# Patient Record
Sex: MALE | Race: WHITE | NOT HISPANIC OR LATINO | Employment: FULL TIME | URBAN - METROPOLITAN AREA
[De-identification: names, ages, dates, MRNs, and addresses within clinical notes are randomized per-mention and may not be internally consistent; named-entity substitution may affect disease eponyms.]

---

## 2023-03-21 ENCOUNTER — OFFICE VISIT (OUTPATIENT)
Dept: PRIMARY CARE | Facility: CLINIC | Age: 28
End: 2023-03-21
Payer: COMMERCIAL

## 2023-03-21 VITALS
TEMPERATURE: 98.1 F | HEART RATE: 75 BPM | BODY MASS INDEX: 32.73 KG/M2 | RESPIRATION RATE: 18 BRPM | SYSTOLIC BLOOD PRESSURE: 100 MMHG | HEIGHT: 69 IN | WEIGHT: 221 LBS | OXYGEN SATURATION: 97 % | DIASTOLIC BLOOD PRESSURE: 66 MMHG

## 2023-03-21 DIAGNOSIS — R06.83 SNORING: ICD-10-CM

## 2023-03-21 DIAGNOSIS — E78.5 HYPERLIPIDEMIA, UNSPECIFIED HYPERLIPIDEMIA TYPE: ICD-10-CM

## 2023-03-21 DIAGNOSIS — R53.83 FATIGUE, UNSPECIFIED TYPE: ICD-10-CM

## 2023-03-21 DIAGNOSIS — Z13.220 SCREENING, LIPID: ICD-10-CM

## 2023-03-21 DIAGNOSIS — J98.01 BRONCHOSPASM, ACUTE: ICD-10-CM

## 2023-03-21 DIAGNOSIS — Z91.018 NUT ALLERGY: ICD-10-CM

## 2023-03-21 DIAGNOSIS — Z00.00 HEALTHCARE MAINTENANCE: Primary | ICD-10-CM

## 2023-03-21 PROCEDURE — 99385 PREV VISIT NEW AGE 18-39: CPT | Performed by: FAMILY MEDICINE

## 2023-03-21 PROCEDURE — 1036F TOBACCO NON-USER: CPT | Performed by: FAMILY MEDICINE

## 2023-03-21 RX ORDER — EPINEPHRINE 0.3 MG/.3ML
1 INJECTION SUBCUTANEOUS ONCE
COMMUNITY

## 2023-03-21 RX ORDER — ALBUTEROL SULFATE 90 UG/1
2 AEROSOL, METERED RESPIRATORY (INHALATION) EVERY 6 HOURS PRN
Qty: 6.7 G | Refills: 2 | Status: SHIPPED | OUTPATIENT
Start: 2023-03-21 | End: 2024-05-13 | Stop reason: SDUPTHER

## 2023-03-21 ASSESSMENT — PATIENT HEALTH QUESTIONNAIRE - PHQ9
1. LITTLE INTEREST OR PLEASURE IN DOING THINGS: NOT AT ALL
SUM OF ALL RESPONSES TO PHQ9 QUESTIONS 1 AND 2: 0
2. FEELING DOWN, DEPRESSED OR HOPELESS: NOT AT ALL

## 2023-03-21 ASSESSMENT — ENCOUNTER SYMPTOMS
DEPRESSION: 0
OCCASIONAL FEELINGS OF UNSTEADINESS: 0
LOSS OF SENSATION IN FEET: 0

## 2023-03-21 NOTE — PATIENT INSTRUCTIONS
Obtain RAST testing for further evaluation.  Utilize albuterol inhaler. 2 puffs every 6 hours, as needed.  Please undergo home sleep study. The instructions and equipment will be sent to your home.   I have instructed the patient to follow-up with me in 6 weeks or sooner if needed. Please obtain fasting labs prior to visit. You may drink water or black coffee prior.

## 2023-03-21 NOTE — PROGRESS NOTES
Subjective   Patient ID: Thong Desai is a 28 y.o. male who presents for New Patient Visit.    Patient last seen a provider in over 6 yrs ago.  He has no medical complaints today.     He is requesting a referral to a allergist for an allergen test due to recent reaction to possible Cashews/Tree Nuts/Pine Nuts/Peanuts. He notes allergy to peanuts, resolved with Benadryl. ED on 01/18/23 due to Tree nut reaction.     Patient asks to obtain an inhaler due to frequent wheezing with exertion. He states he has not been diagnosed with asthma. Patient's mother and brother have diagnosed asthma. He notes impacted breathing with dust and pet dandruff as well.    Patient reports his father has sleep apnea, and inquires about obtaining a sleep study or waiting for apnea to possibly develop. He wakes approximately 3-5 times to urinate. Patient's wife states he has recently began snoring. He is open to doing a home sleep study.    He has fatigue, but notes he has contracted COVID-19 three times.    Review of Systems  Except positives as noted in the CC & HPI   Constitutional: Denies fevers, chills, night sweats, fatigue, weight changes, change in appetite   Eyes: Denies blurry vision, double vision   ENT: Denies otalgia, trouble hearing, tinnitus, vertigo, nasal congestion, rhinorrhea, sore throat   Neck: Denies swelling, masses   Cardiovascular: Denies chest pain, palpitations, edema, orthopnea, syncope   Respiratory: Denies dyspnea, cough, wheezing, postural nocturnal dyspnea   Gastrointestinal: Denies abdominal pain, nausea, vomiting, diarrhea, constipation, melena, hematochezia   Genitourinary: Positive frequency, positive nocturia. Denies dysuria, hematuria, urgency   Musculoskeletal: Denies back pain, neck pain, arthralgias, myalgias   Integumentary: Denies skin lesions, rashes, masses   Neurological: Denies dizziness, headaches, confusion, limb weakness, paresthesias, syncope, convulsions   Psychiatric: Denies depression,  anxiety, homicidal ideations, suicidal ideations, sleep disturbances   Endocrine: Denies polyphagia, polydipsia, polyuria, weakness, hair thinning, heat intolerance, cold intolerance, weight changes   Heme/Lymph: Denies easy bruising, easy bleeding, swollen glands     Physical Exam  GENERAL APPEARANCE: well-developed, well-nourished, 28 y.o. male in no acute distress.  SKIN: warm, pink and dry without rash or concerning lesions.  MENTAL STATUS: alert and oriented × 3. Normal mood and affect appropriate to mood.  EARS: TMs shiny and move well with insufflation. Ear canals are clear bilaterally.  NECK: supple without lymphadenopathy. Carotid pulses are normal without bruits. Thyroid - is normal in midline without nodules.  CHEST: lungs are clear to auscultation without rales, rhonchi or wheezes.  HEART: regular, rate and rhythm without murmurs, rubs or gallops.  ABDOMEN: soft, moderately obese, protuberant, nondistended. No masses, hepatomegaly or splenomegaly is noted.  EXTREMITIES: no cyanosis, clubbing or edema. Pedal pulses are 2+ normal at the dorsalis pedis and posterior tibial pulses bilaterally.  NEUROLOGICAL: cranial nerves II through XII are grossly intact. Motor strength 5/5 at all fours. DTRs are 2+ normal at all fours bilaterally and symmetrically.    By signing my name below, Brooke CASTILLO Scribe   attest that this documentation has been prepared under the direction and in the presence of Umang Jacobo MD.     Assessment/Plan     1. Healthcare maintenance  CBC and Auto Differential    Comprehensive Metabolic Panel      2. Nut allergy  Brazil Nut IgE    Macadamia nut IgE    Pecan, Nut IgE    Brazil Nut Component Panel    Cashew Nut Component RAna o 3    Pinenut IgE    Nutmeg IgE    Theodore IgE    Theodore Component Panel    Peanut Component Panel    Peanut IgE    albuterol (Proventil HFA) 90 mcg/actuation inhaler      3. Bronchospasm, acute  albuterol (Proventil HFA) 90 mcg/actuation inhaler       4. Hyperlipidemia, unspecified hyperlipidemia type  Lipid Panel      5. Screening, lipid        6. Fatigue, unspecified type  Home sleep apnea test (HSAT)      7. Snoring  Home sleep apnea test (HSAT)            Nut Allergy  Obtain RAST testing for further evaluation.    Wheezing  Utilize albuterol inhaler. 2 puffs every 6 hours as needed.    Fatigue  Please undergo home sleep study. The instructions and equipment will be sent to your home.      I have instructed the patient to follow-up with me in 6 weeks or sooner if needed. Please obtain fasting labs prior to visit. You may drink water or black coffee prior.

## 2023-04-24 DIAGNOSIS — R06.83 SNORING: ICD-10-CM

## 2023-04-24 DIAGNOSIS — R53.83 FATIGUE, UNSPECIFIED TYPE: ICD-10-CM

## 2023-05-12 ENCOUNTER — LAB (OUTPATIENT)
Dept: LAB | Facility: LAB | Age: 28
End: 2023-05-12
Payer: COMMERCIAL

## 2023-05-12 DIAGNOSIS — Z91.018 NUT ALLERGY: ICD-10-CM

## 2023-05-12 DIAGNOSIS — Z00.00 HEALTHCARE MAINTENANCE: ICD-10-CM

## 2023-05-12 DIAGNOSIS — E78.5 HYPERLIPIDEMIA, UNSPECIFIED HYPERLIPIDEMIA TYPE: ICD-10-CM

## 2023-05-12 LAB
ALANINE AMINOTRANSFERASE (SGPT) (U/L) IN SER/PLAS: 24 U/L (ref 10–52)
ALBUMIN (G/DL) IN SER/PLAS: 4.9 G/DL (ref 3.4–5)
ALKALINE PHOSPHATASE (U/L) IN SER/PLAS: 69 U/L (ref 33–120)
ANION GAP IN SER/PLAS: 11 MMOL/L (ref 10–20)
ASPARTATE AMINOTRANSFERASE (SGOT) (U/L) IN SER/PLAS: 24 U/L (ref 9–39)
BASOPHILS (10*3/UL) IN BLOOD BY AUTOMATED COUNT: 0.05 X10E9/L (ref 0–0.1)
BASOPHILS/100 LEUKOCYTES IN BLOOD BY AUTOMATED COUNT: 0.6 % (ref 0–2)
BILIRUBIN TOTAL (MG/DL) IN SER/PLAS: 0.8 MG/DL (ref 0–1.2)
CALCIUM (MG/DL) IN SER/PLAS: 9.6 MG/DL (ref 8.6–10.3)
CARBON DIOXIDE, TOTAL (MMOL/L) IN SER/PLAS: 27 MMOL/L (ref 21–32)
CHLORIDE (MMOL/L) IN SER/PLAS: 104 MMOL/L (ref 98–107)
CHOLESTEROL (MG/DL) IN SER/PLAS: 165 MG/DL (ref 0–199)
CHOLESTEROL IN HDL (MG/DL) IN SER/PLAS: 39.3 MG/DL
CHOLESTEROL/HDL RATIO: 4.2
CREATININE (MG/DL) IN SER/PLAS: 1 MG/DL (ref 0.5–1.3)
EOSINOPHILS (10*3/UL) IN BLOOD BY AUTOMATED COUNT: 0.2 X10E9/L (ref 0–0.7)
EOSINOPHILS/100 LEUKOCYTES IN BLOOD BY AUTOMATED COUNT: 2.2 % (ref 0–6)
ERYTHROCYTE DISTRIBUTION WIDTH (RATIO) BY AUTOMATED COUNT: 13.1 % (ref 11.5–14.5)
ERYTHROCYTE MEAN CORPUSCULAR HEMOGLOBIN CONCENTRATION (G/DL) BY AUTOMATED: 34 G/DL (ref 32–36)
ERYTHROCYTE MEAN CORPUSCULAR VOLUME (FL) BY AUTOMATED COUNT: 87 FL (ref 80–100)
ERYTHROCYTES (10*6/UL) IN BLOOD BY AUTOMATED COUNT: 6.11 X10E12/L (ref 4.5–5.9)
GFR MALE: >90 ML/MIN/1.73M2
GLUCOSE (MG/DL) IN SER/PLAS: 80 MG/DL (ref 74–99)
HEMATOCRIT (%) IN BLOOD BY AUTOMATED COUNT: 52.9 % (ref 41–52)
HEMOGLOBIN (G/DL) IN BLOOD: 18 G/DL (ref 13.5–17.5)
IMMATURE GRANULOCYTES/100 LEUKOCYTES IN BLOOD BY AUTOMATED COUNT: 0.1 % (ref 0–0.9)
LDL: 95 MG/DL (ref 0–99)
LEUKOCYTES (10*3/UL) IN BLOOD BY AUTOMATED COUNT: 9 X10E9/L (ref 4.4–11.3)
LYMPHOCYTES (10*3/UL) IN BLOOD BY AUTOMATED COUNT: 3.36 X10E9/L (ref 1.2–4.8)
LYMPHOCYTES/100 LEUKOCYTES IN BLOOD BY AUTOMATED COUNT: 37.4 % (ref 13–44)
MONOCYTES (10*3/UL) IN BLOOD BY AUTOMATED COUNT: 0.55 X10E9/L (ref 0.1–1)
MONOCYTES/100 LEUKOCYTES IN BLOOD BY AUTOMATED COUNT: 6.1 % (ref 2–10)
NEUTROPHILS (10*3/UL) IN BLOOD BY AUTOMATED COUNT: 4.81 X10E9/L (ref 1.2–7.7)
NEUTROPHILS/100 LEUKOCYTES IN BLOOD BY AUTOMATED COUNT: 53.6 % (ref 40–80)
PLATELETS (10*3/UL) IN BLOOD AUTOMATED COUNT: 236 X10E9/L (ref 150–450)
POTASSIUM (MMOL/L) IN SER/PLAS: 4.3 MMOL/L (ref 3.5–5.3)
PROTEIN TOTAL: 6.9 G/DL (ref 6.4–8.2)
SODIUM (MMOL/L) IN SER/PLAS: 138 MMOL/L (ref 136–145)
TRIGLYCERIDE (MG/DL) IN SER/PLAS: 154 MG/DL (ref 0–149)
UREA NITROGEN (MG/DL) IN SER/PLAS: 19 MG/DL (ref 6–23)
VLDL: 31 MG/DL (ref 0–40)

## 2023-05-12 PROCEDURE — 80053 COMPREHEN METABOLIC PANEL: CPT

## 2023-05-12 PROCEDURE — 85025 COMPLETE CBC W/AUTO DIFF WBC: CPT

## 2023-05-12 PROCEDURE — 36415 COLL VENOUS BLD VENIPUNCTURE: CPT

## 2023-05-12 PROCEDURE — 86008 ALLG SPEC IGE RECOMB EA: CPT

## 2023-05-12 PROCEDURE — 80061 LIPID PANEL: CPT

## 2023-05-12 PROCEDURE — 86003 ALLG SPEC IGE CRUDE XTRC EA: CPT

## 2023-05-13 LAB
ALLERGEN FOOD: BRAZIL NUT (BERTHOLLETIA EXCELSA) IGE (KU/L): <0.1 KU/L
ALLERGEN FOOD: PEANUT (ARACHIS HYPOGAEA) IGE (KU/L): <0.1 KU/L
ALLERGEN FOOD: PECAN NUT (CARYA ILLINOENSIS) IGE (KU/L): 0.45 KU/L
ALLERGEN FOOD: WALNUT (JUGLANS SPP.) IGE (KU/L): 0.46 KU/L

## 2023-05-14 NOTE — RESULT ENCOUNTER NOTE
Please call the patient regarding his abnormal result.  Minor allergic reactions to pecans and walnuts.  T.Chol 165, LDL 95, HDL 39, . Follow low cholesterol, low fat diet and exercise as tolerated.  Hemoglobin is elevated at 6.11.  Continue to monitor.

## 2023-05-16 LAB
ALLERGEN FOOD: MACADAMIA NUT (MACADAMIA SPP.) IGE (KU/L): <0.1 KU/L
ALLERGEN FOOD: PINE NUT, PIGNOLES (PINUS EDULIS) IGE (KU/L): 0.31 KU/L
IMMUNOCAP INTERPRETATION (ARUP): NORMAL
Lab: <0.1 KU/L

## 2023-05-17 LAB
BRAZIL NUT COMP.RBERE1, VIRC: <0.1 KU/L
CASHEW COMP. RA O3, VIRC: 2.23 KU/L
CLASS ARA H1, VIRC: 0
CLASS ARA H2, VIRC: 0
CLASS ARA H3, VIRC: 0
CLASS ARA H8, VIRC: 0
CLASS ARA H9, VIRC: 0
CLASS BRAZIL NUT RBERE1, VIRC: 0
CLASS CASHEW RA O3 , VIRC: 2
CLASS WALNUT RJUGR1, VIRC: 1
CLASS WALNUT RJUGR3, VIRC: 0
PEANUT COMP. ARA H1, VIRC: <0.1 KU/L
PEANUT COMP. ARA H2, VIRC: <0.1 KU/L
PEANUT COMP. ARA H3, VIRC: <0.1 KU/L
PEANUT COMP. ARA H8, VIRC: <0.1 KU/L
PEANUT COMP. ARA H9, VIRC: <0.1 KU/L
WALNUT COMP. RJUGR1, VIRC: 0.48 KU/L
WALNUT COMP. RJUGR3, VIRC: <0.1 KU/L

## 2023-05-22 NOTE — PROGRESS NOTES
No chief complaint on file.      HPI: Thong Desai is a pleasant 28 y.o. male presenting for follow-up of Sleep Study.  I last saw the patient on 04/17/2023.         ROS    Except positives as noted in the CC & HPI   Constitutional: Denies fevers, chills, night sweats, fatigue, weight changes, change in appetite   Eyes: Denies blurry vision, double vision   ENT: Denies otalgia, trouble hearing, tinnitus, vertigo, nasal congestion, rhinorrhea, sore throat   Neck: Denies swelling, masses   Cardiovascular: Denies chest pain, palpitations, edema, orthopnea, syncope   Respiratory: Denies dyspnea, cough, wheezing, postural nocturnal dyspnea   Gastrointestinal: Denies abdominal pain, nausea, vomiting, diarrhea, constipation, melena, hematochezia   Genitourinary: Denies dysuria, hematuria, frequency, urgency   Musculoskeletal: Denies back pain, neck pain, arthralgias, myalgias   Integumentary: Denies skin lesions, rashes, masses   Neurological: Denies dizziness, headaches, confusion, limb weakness, paresthesias, syncope, convulsions   Psychiatric: Denies depression, anxiety, homicidal ideations, suicidal ideations, sleep disturbances   Endocrine: Denies polyphagia, polydipsia, polyuria, weakness, hair thinning, heat intolerance, cold intolerance, weight changes   Heme/Lymph: Denies easy bruising, easy bleeding, swollen glands     There were no vitals filed for this visit.    PHYSICAL EXAM    GENERAL APPEARANCE: well-developed, well-nourished, 28 y.o. male in no acute distress.  SKIN: warm, pink and dry without rash or concerning lesions.  MENTAL STATUS: alert and oriented × 3. Normal mood and affect appropriate to mood.  EARS: TMs shiny and move well with insufflation. Ear canals are clear bilaterally.  NECK: supple without lymphadenopathy. Carotid pulses are normal without bruits. Thyroid - is normal in midline without nodules.  CHEST: lungs are clear to auscultation without rales, rhonchi or wheezes.  HEART: regular,  rate and rhythm without murmurs, rubs or gallops.  ABDOMEN: soft, flat, nondistended. No masses, hepatomegaly or splenomegaly is noted.  EXTREMITIES: no cyanosis, clubbing or edema. Pedal pulses are 2+ normal at the dorsalis pedis and posterior tibial pulses bilaterally.  NEUROLOGICAL: cranial nerves II through XII are grossly intact. Motor strength 5/5 at all fours. DTRs are 2+ normal at all fours bilaterally and symmetrically.      Below is the patient's most recent value for Albumin, ALT, AST, BUN, Calcium, Chloride, Cholesterol, CO2, Creatinine, GFR, Glucose, HDL, Hematocrit, Hemoglobin, Hemoglobin A1C, LDL, Magnesium, Phosphorus, Platelets, Potassium, PSA, Sodium, Triglycerides, and WBC.   Lab Results   Component Value Date    ALBUMIN 4.9 05/12/2023    ALT 24 05/12/2023    AST 24 05/12/2023    BUN 19 05/12/2023    CALCIUM 9.6 05/12/2023     05/12/2023    CHOL 165 05/12/2023    CO2 27 05/12/2023    CREATININE 1.00 05/12/2023    HDL 39.3 (A) 05/12/2023    HCT 52.9 (H) 05/12/2023    HGB 18.0 (H) 05/12/2023     05/12/2023    K 4.3 05/12/2023     05/12/2023    TRIG 154 (H) 05/12/2023    WBC 9.0 05/12/2023         ASSESSMENT:  No diagnosis found.    PLAN:  No orders of the defined types were placed in this encounter.      [unfilled]    I have instructed the patient to follow-up with me in *** months or sooner if needed.

## 2023-05-23 ENCOUNTER — APPOINTMENT (OUTPATIENT)
Dept: PRIMARY CARE | Facility: CLINIC | Age: 28
End: 2023-05-23
Payer: COMMERCIAL

## 2023-06-16 ENCOUNTER — OFFICE VISIT (OUTPATIENT)
Dept: PRIMARY CARE | Facility: CLINIC | Age: 28
End: 2023-06-16
Payer: COMMERCIAL

## 2023-06-16 VITALS
WEIGHT: 220 LBS | OXYGEN SATURATION: 99 % | SYSTOLIC BLOOD PRESSURE: 127 MMHG | RESPIRATION RATE: 16 BRPM | TEMPERATURE: 97.4 F | HEART RATE: 71 BPM | DIASTOLIC BLOOD PRESSURE: 77 MMHG | BODY MASS INDEX: 32.49 KG/M2

## 2023-06-16 DIAGNOSIS — L23.1 ALLERGIC CONTACT DERMATITIS DUE TO ADHESIVES: Primary | ICD-10-CM

## 2023-06-16 PROBLEM — T30.0 BURN INJURY: Status: ACTIVE | Noted: 2023-06-05

## 2023-06-16 PROCEDURE — 99212 OFFICE O/P EST SF 10 MIN: CPT | Performed by: NURSE PRACTITIONER

## 2023-06-16 PROCEDURE — 1036F TOBACCO NON-USER: CPT | Performed by: NURSE PRACTITIONER

## 2023-06-16 RX ORDER — TRIAMCINOLONE ACETONIDE 1 MG/G
OINTMENT TOPICAL 2 TIMES DAILY PRN
Qty: 15 G | Refills: 0 | Status: SHIPPED | OUTPATIENT
Start: 2023-06-16 | End: 2023-10-14

## 2023-06-16 RX ORDER — HYDROCORTISONE 25 MG/G
CREAM TOPICAL
COMMUNITY
Start: 2023-06-15

## 2023-06-16 ASSESSMENT — ENCOUNTER SYMPTOMS
FATIGUE: 0
HEMATOLOGIC/LYMPHATIC NEGATIVE: 1
COUGH: 0
RESPIRATORY NEGATIVE: 1
CONSTITUTIONAL NEGATIVE: 1
SORE THROAT: 0
VOMITING: 0
FEVER: 0
SHORTNESS OF BREATH: 0
MUSCULOSKELETAL NEGATIVE: 1
GASTROINTESTINAL NEGATIVE: 1
DIARRHEA: 0
ALLERGIC/IMMUNOLOGIC NEGATIVE: 1
NAIL CHANGES: 0
ENDOCRINE NEGATIVE: 1
CARDIOVASCULAR NEGATIVE: 1
EYE PAIN: 0
PSYCHIATRIC NEGATIVE: 1
ANOREXIA: 0
NEUROLOGICAL NEGATIVE: 1
RHINORRHEA: 0
EYES NEGATIVE: 1

## 2023-06-16 NOTE — PROGRESS NOTES
Patient's PCP is Umang Jacobo MD    RASH   Symptoms:  Rash on LEFT side----itchy   Length of Symptoms: 1-2 Weeks  Denies:   Factors that effect symptoms:    --Related Information--  Given hydrocortisone 2.5% cream on 06.15.2023

## 2023-06-16 NOTE — ASSESSMENT & PLAN NOTE
Patient will use cool compresses over area, avoid hot showers, use emollient and lotion as discussed.  Patient will take daily allergy tablet as directed. Patient will use prescription as directed over area and avoid face and genitals.  Patient will monitor for worsening symptoms and follow up as directed.

## 2023-06-16 NOTE — PATIENT INSTRUCTIONS
Patient was seen and examined.  Diagnosis, treatment, and possible complications of today's illness discussed and explained to patient.  Patient will use cool compresses over area, avoid hot showers, use emollient and lotion as discussed.  Patient will take daily allergy tablet as directed. Patient will use prescription as directed over area and avoid face and genitals.  Patient will monitor for worsening symptoms and follow up as directed.  Patient educated and advised to come back if worsening or persistent symptoms. Patient educated on when to seek urgent/emergent care. Patient was given opportunity to ask questions and denied any at this time.  Patient verbalized understanding and agrees with plan of care.

## 2023-06-16 NOTE — PROGRESS NOTES
Subjective   Patient ID: Thong Desai is a 28 y.o. male who presents for Rash.  Patient presents to convenient care appointment with complaint of itchy rash on left side x 1 week.  Patient was seen virtually yesterday and given rx for topical cream. Patient has been using hydrocortisone 2.5% with some relief.  Patient denies swelling in mouth or throat, chest pain, shortness of breath, nausea, vomiting nor diarrhea.       Rash  This is a new problem. The current episode started in the past 7 days. The problem is unchanged. The affected locations include the abdomen. The rash is characterized by redness and itchiness. Associated with: multiple applications of bandaids. Pertinent negatives include no anorexia, congestion, cough, diarrhea, eye pain, facial edema, fatigue, fever, joint pain, nail changes, rhinorrhea, shortness of breath, sore throat or vomiting. Past treatments include topical steroids.       Review of Systems   Constitutional: Negative.  Negative for fatigue and fever.   HENT: Negative.  Negative for congestion, rhinorrhea and sore throat.    Eyes: Negative.  Negative for pain.   Respiratory: Negative.  Negative for cough and shortness of breath.    Cardiovascular: Negative.    Gastrointestinal: Negative.  Negative for anorexia, diarrhea and vomiting.   Endocrine: Negative.    Genitourinary: Negative.    Musculoskeletal: Negative.  Negative for joint pain.   Skin:  Positive for rash. Negative for nail changes.   Allergic/Immunologic: Negative.    Neurological: Negative.    Hematological: Negative.    Psychiatric/Behavioral: Negative.     All other systems reviewed and are negative.      /77   Pulse 71   Temp 36.3 °C (97.4 °F)   Resp 16   Wt 99.8 kg (220 lb)   SpO2 99%   BMI 32.49 kg/m²     Objective   Physical Exam  Vitals and nursing note reviewed.   Constitutional:       Appearance: Normal appearance.   HENT:      Head: Normocephalic and atraumatic.      Right Ear: Tympanic membrane, ear  canal and external ear normal.      Left Ear: Tympanic membrane, ear canal and external ear normal.      Nose: Nose normal.      Mouth/Throat:      Mouth: Mucous membranes are moist.      Pharynx: Oropharynx is clear.   Eyes:      Extraocular Movements: Extraocular movements intact.      Conjunctiva/sclera: Conjunctivae normal.      Pupils: Pupils are equal, round, and reactive to light.   Cardiovascular:      Rate and Rhythm: Normal rate and regular rhythm.      Pulses: Normal pulses.      Heart sounds: Normal heart sounds.   Pulmonary:      Effort: Pulmonary effort is normal.   Musculoskeletal:         General: Normal range of motion.      Cervical back: Normal range of motion and neck supple.   Skin:     General: Skin is warm and dry.      Capillary Refill: Capillary refill takes less than 2 seconds.      Findings: Rash present.      Comments: Patient has four areas of erythema resembling Band-Aid on left flank area.  No drainage, edema nor open wounds in area.     Neurological:      General: No focal deficit present.      Mental Status: He is alert and oriented to person, place, and time.   Psychiatric:         Mood and Affect: Mood normal.         Behavior: Behavior normal.         Assessment/Plan   Problem List Items Addressed This Visit          Other    Allergic contact dermatitis due to adhesives - Primary    Relevant Medications    triamcinolone (Kenalog) 0.1 % ointment

## 2023-06-23 NOTE — PROGRESS NOTES
"Subjective   Patient ID: Thong Desai is a 28 y.o. male who presents for Follow-up and Hyperlipidemia. I last saw the patient on 3/21/2023.     HPI   Patient to discuss lab results. He has no medical complaints today.     Review of Systems  Except positives as noted in the CC & HPI      Constitutional: Denies fevers, chills, night sweats, fatigue, weight changes, change in appetite    Eyes: Denies blurry vision, double vision    ENT: Denies otalgia, trouble hearing, tinnitus, vertigo, nasal congestion, rhinorrhea, sore throat    Neck: Denies swelling, masses    Cardiovascular: Denies chest pain, palpitations, edema, orthopnea, syncope    Respiratory: Denies dyspnea, cough, wheezing, postural nocturnal dyspnea    Gastrointestinal: Denies abdominal pain, nausea, vomiting, diarrhea, constipation, melena, hematochezia    Genitourinary: Denies dysuria, hematuria, frequency, urgency    Musculoskeletal: Denies back pain, neck pain, arthralgias, myalgias    Integumentary: Denies skin lesions, rashes, masses    Neurological: Denies dizziness, headaches, confusion, limb weakness, paresthesias, syncope, convulsions    Psychiatric: Denies depression, anxiety, homicidal ideations, suicidal ideations, sleep disturbances    Endocrine: Denies polyphagia, polydipsia, polyuria, weakness, hair thinning, heat intolerance, cold intolerance, weight changes    Heme/Lymph: Denies easy bruising, easy bleeding, swollen glands     Objective   /72 (BP Location: Right arm, Patient Position: Sitting)   Pulse 83   Temp 36.6 °C (97.9 °F) (Temporal)   Resp 16   Ht 1.727 m (5' 8\")   Wt 101 kg (223 lb)   SpO2 97%   BMI 33.91 kg/m²     Physical Exam  114/76 on recheck of BP in the right arm.     General Appearance - well-developed, well-nourished, 28 y.o., White male in no acute distress.     Skin - warm, pink and dry without rash or concerning lesions. Healing burn of the left palm.     Mental Status - alert and oriented x 3. Normal " mood and affect appropriate to mood.     Neck - supple without lymphadenopathy. Carotid pulses are normal without bruits. Thyroid is normal in midline without nodules.     Chest - lungs are clear to auscultation without rales, rhonchi or wheezes.     Heart - regular, rate and rhythm without murmurs, rubs or gallops.    Abdomen - soft, obese, protuberant, nontender, nondistended. No masses, hepatomegaly or splenomegaly is noted. No rebound, rigidity or guarding is noted. Bowel sounds are normoactive.    Extremities - no cyanosis, clubbing or edema. Pedal pulses are 2+ normal at the dorsalis pedis and posterior pulses bilaterally.     Neurological - cranial nerves II through XII are grossly intact. Motor strength 5/5 at all fours.     Lab Results   Component Value Date    ALBUMIN 4.9 05/12/2023    ALT 24 05/12/2023    AST 24 05/12/2023    BUN 19 05/12/2023    CALCIUM 9.6 05/12/2023     05/12/2023    CHOL 165 05/12/2023    CO2 27 05/12/2023    CREATININE 1.00 05/12/2023    GFRMALE >90 05/12/2023    GLUCOSE 80 05/12/2023    HDL 39.3 (A) 05/12/2023    HCT 52.9 (H) 05/12/2023    HGB 18.0 (H) 05/12/2023    K 4.3 05/12/2023    LDLF 95 05/12/2023     05/12/2023     05/12/2023    TRIG 154 (H) 05/12/2023    WBC 9.0 05/12/2023     Results  Reviewed home sleep apnea test results from 4/24/23.      Assessment/Plan   1. Mixed hyperlipidemia        2. Nut allergy      Pecans and walnuts      3. 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 Class 1 obesity due to excess calories without serious comorbidity with body mass index (BMI) of 33.0 to 33.9 in adult        Patient to continue current medications (with any exceptions as noted) and diet. Follow-up in 12 month(s) otherwise as needed.          Scribe Attestation  By signing my name below, IBrooke Scribe   attest that this documentation has been prepared under the direction and in the presence of Umang Jacobo MD.

## 2023-06-26 ENCOUNTER — OFFICE VISIT (OUTPATIENT)
Dept: PRIMARY CARE | Facility: CLINIC | Age: 28
End: 2023-06-26
Payer: COMMERCIAL

## 2023-06-26 VITALS
TEMPERATURE: 97.9 F | OXYGEN SATURATION: 97 % | SYSTOLIC BLOOD PRESSURE: 115 MMHG | HEIGHT: 68 IN | RESPIRATION RATE: 16 BRPM | HEART RATE: 83 BPM | BODY MASS INDEX: 33.8 KG/M2 | DIASTOLIC BLOOD PRESSURE: 72 MMHG | WEIGHT: 223 LBS

## 2023-06-26 DIAGNOSIS — E78.2 MIXED HYPERLIPIDEMIA: Primary | ICD-10-CM

## 2023-06-26 DIAGNOSIS — E66.09 CLASS 1 OBESITY DUE TO EXCESS CALORIES WITHOUT SERIOUS COMORBIDITY WITH BODY MASS INDEX (BMI) OF 33.0 TO 33.9 IN ADULT: ICD-10-CM

## 2023-06-26 DIAGNOSIS — Z91.018 NUT ALLERGY: ICD-10-CM

## 2023-06-26 DIAGNOSIS — Z91.018 ALLERGY TO CASHEW NUT: ICD-10-CM

## 2023-06-26 PROBLEM — T30.0 BURN INJURY: Status: RESOLVED | Noted: 2023-06-05 | Resolved: 2023-06-26

## 2023-06-26 PROBLEM — E66.811 CLASS 1 OBESITY DUE TO EXCESS CALORIES WITHOUT SERIOUS COMORBIDITY WITH BODY MASS INDEX (BMI) OF 33.0 TO 33.9 IN ADULT: Status: ACTIVE | Noted: 2023-06-26

## 2023-06-26 PROCEDURE — 99213 OFFICE O/P EST LOW 20 MIN: CPT | Performed by: FAMILY MEDICINE

## 2023-06-26 PROCEDURE — 1036F TOBACCO NON-USER: CPT | Performed by: FAMILY MEDICINE

## 2023-06-26 PROCEDURE — 3008F BODY MASS INDEX DOCD: CPT | Performed by: FAMILY MEDICINE

## 2023-06-26 NOTE — PATIENT INSTRUCTIONS
Patient to continue current medications (with any exceptions as noted) and diet. Follow-up in 12 month(s) otherwise as needed.

## 2024-05-13 DIAGNOSIS — Z91.018 NUT ALLERGY: ICD-10-CM

## 2024-05-13 DIAGNOSIS — J98.01 BRONCHOSPASM, ACUTE: ICD-10-CM

## 2024-05-13 RX ORDER — ALBUTEROL SULFATE 90 UG/1
2 AEROSOL, METERED RESPIRATORY (INHALATION) EVERY 6 HOURS PRN
Qty: 6.7 G | Refills: 2 | Status: SHIPPED | OUTPATIENT
Start: 2024-05-13 | End: 2025-05-13

## 2024-05-13 NOTE — TELEPHONE ENCOUNTER
Rx Refill Request     Name: Thong Desai  :  1995   Medication Name:  albuterol inhaler   Specific Pharmacy location:  [facility]  Date of last appointment:  2023  Date of next appointment:  Visit date not found   Best number to reach patient:  [phone]

## 2024-09-04 ENCOUNTER — OFFICE VISIT (OUTPATIENT)
Dept: PRIMARY CARE | Facility: CLINIC | Age: 29
End: 2024-09-04
Payer: COMMERCIAL

## 2024-09-04 VITALS
WEIGHT: 227 LBS | DIASTOLIC BLOOD PRESSURE: 62 MMHG | HEART RATE: 72 BPM | BODY MASS INDEX: 34.4 KG/M2 | HEIGHT: 68 IN | SYSTOLIC BLOOD PRESSURE: 98 MMHG

## 2024-09-04 DIAGNOSIS — S16.1XXA STRAIN OF NECK MUSCLE, INITIAL ENCOUNTER: Primary | ICD-10-CM

## 2024-09-04 PROCEDURE — 1036F TOBACCO NON-USER: CPT | Performed by: FAMILY MEDICINE

## 2024-09-04 PROCEDURE — 3008F BODY MASS INDEX DOCD: CPT | Performed by: FAMILY MEDICINE

## 2024-09-04 PROCEDURE — 99213 OFFICE O/P EST LOW 20 MIN: CPT | Performed by: FAMILY MEDICINE

## 2024-09-04 RX ORDER — BENZONATATE 100 MG/1
200 CAPSULE ORAL 3 TIMES DAILY PRN
COMMUNITY
Start: 2024-03-11

## 2024-09-04 RX ORDER — CYCLOBENZAPRINE HCL 10 MG
10 TABLET ORAL 3 TIMES DAILY PRN
Qty: 30 TABLET | Refills: 0 | Status: SHIPPED | OUTPATIENT
Start: 2024-09-04 | End: 2024-11-03

## 2024-09-04 RX ORDER — MELOXICAM 15 MG/1
15 TABLET ORAL DAILY
Qty: 30 TABLET | Refills: 11 | Status: SHIPPED | OUTPATIENT
Start: 2024-09-04 | End: 2025-09-04

## 2024-09-04 ASSESSMENT — PATIENT HEALTH QUESTIONNAIRE - PHQ9
SUM OF ALL RESPONSES TO PHQ9 QUESTIONS 1 AND 2: 0
1. LITTLE INTEREST OR PLEASURE IN DOING THINGS: NOT AT ALL
2. FEELING DOWN, DEPRESSED OR HOPELESS: NOT AT ALL

## 2024-09-04 ASSESSMENT — ENCOUNTER SYMPTOMS
CHILLS: 0
CHEST TIGHTNESS: 0
FATIGUE: 0
DIZZINESS: 0
HEADACHES: 0
SHORTNESS OF BREATH: 0

## 2024-09-04 NOTE — PROGRESS NOTES
"Subjective   Patient ID: Thong Desai is a 29 y.o. male who presents for Annual Exam (Right neck and shoulder pain. Woke up with a stiff neck a few weeks ago and he has been doing lifting and bending heavy objects).    Neck pain   - reports he woke up around a week ago with pain in his neck and shoulders   - in the R side primarily, and has pain that radiates down the arm into the should and down into the hand   - has been occasionally taking advil for the pain, typically taking the medication at night and occasionally during the day   - denies any significant numbness or weakness, but does occasionally having tingling  in the arm   - has been doing some heavy lifting of loges/branches the last few weeks as he has been clearing brush in the backyard   - no previous neck injury          Review of Systems   Constitutional:  Negative for chills and fatigue.   Respiratory:  Negative for chest tightness and shortness of breath.    Neurological:  Negative for dizziness and headaches.       Objective   BP 98/62   Pulse 72   Ht 1.721 m (5' 7.75\")   Wt 103 kg (227 lb)   BMI 34.77 kg/m²     Physical Exam  Constitutional:       Appearance: Normal appearance.   HENT:      Right Ear: Tympanic membrane normal.      Left Ear: Tympanic membrane normal.      Nose: Nose normal.      Mouth/Throat:      Mouth: Mucous membranes are moist.      Pharynx: Oropharynx is clear.   Eyes:      Pupils: Pupils are equal, round, and reactive to light.   Cardiovascular:      Rate and Rhythm: Normal rate and regular rhythm.   Pulmonary:      Effort: Pulmonary effort is normal. No respiratory distress.      Breath sounds: Normal breath sounds.   Abdominal:      General: Abdomen is flat. Bowel sounds are normal.   Musculoskeletal:         General: No swelling or tenderness. Normal range of motion.      Cervical back: Normal range of motion.   Skin:     General: Skin is warm.      Capillary Refill: Capillary refill takes less than 2 seconds. "   Neurological:      General: No focal deficit present.      Mental Status: He is alert and oriented to person, place, and time. Mental status is at baseline.   Psychiatric:         Mood and Affect: Mood normal.         Thought Content: Thought content normal.         Assessment/Plan   Problem List Items Addressed This Visit    None  Visit Diagnoses         Codes    Strain of neck muscle, initial encounter    -  Primary S16.1XXA    stable   - discussed NSAID muscle relaxer and exercise therapy   - f/u if not improving   - consider PT     Relevant Medications    meloxicam (Mobic) 15 mg tablet    cyclobenzaprine (Flexeril) 10 mg tablet

## 2024-09-17 ENCOUNTER — OFFICE VISIT (OUTPATIENT)
Dept: PRIMARY CARE | Facility: CLINIC | Age: 29
End: 2024-09-17
Payer: COMMERCIAL

## 2024-09-17 VITALS
WEIGHT: 224 LBS | HEART RATE: 72 BPM | SYSTOLIC BLOOD PRESSURE: 100 MMHG | BODY MASS INDEX: 33.95 KG/M2 | DIASTOLIC BLOOD PRESSURE: 62 MMHG | HEIGHT: 68 IN

## 2024-09-17 DIAGNOSIS — R21 SKIN RASH: Primary | ICD-10-CM

## 2024-09-17 PROCEDURE — 3008F BODY MASS INDEX DOCD: CPT | Performed by: FAMILY MEDICINE

## 2024-09-17 PROCEDURE — 1036F TOBACCO NON-USER: CPT | Performed by: FAMILY MEDICINE

## 2024-09-17 PROCEDURE — 99213 OFFICE O/P EST LOW 20 MIN: CPT | Performed by: FAMILY MEDICINE

## 2024-09-17 RX ORDER — PREDNISONE 10 MG/1
TABLET ORAL
Qty: 30 TABLET | Refills: 0 | Status: SHIPPED | OUTPATIENT
Start: 2024-09-17 | End: 2024-09-27

## 2024-09-17 ASSESSMENT — ENCOUNTER SYMPTOMS
SHORTNESS OF BREATH: 0
CHILLS: 0
FATIGUE: 0
HEADACHES: 0
DIZZINESS: 0
CHEST TIGHTNESS: 0

## 2024-09-17 ASSESSMENT — PATIENT HEALTH QUESTIONNAIRE - PHQ9
SUM OF ALL RESPONSES TO PHQ9 QUESTIONS 1 AND 2: 0
2. FEELING DOWN, DEPRESSED OR HOPELESS: NOT AT ALL
1. LITTLE INTEREST OR PLEASURE IN DOING THINGS: NOT AT ALL

## 2024-09-17 NOTE — PROGRESS NOTES
"Subjective   Patient ID: Thong Desai is a 29 y.o. male who presents for Sick Visit (Rash on right shoulder that itches has been there a 2 weeks).    Rash   - reports he first noticed the rash a few weeks ago (around 3 weeks)   - thought it was improving but it is still present   - rash is primarily itchy, and does not usually bleed or drain   - reports he has not had similar rashes in the past          Review of Systems   Constitutional:  Negative for chills and fatigue.   Respiratory:  Negative for chest tightness and shortness of breath.    Neurological:  Negative for dizziness and headaches.       Objective   /62   Pulse 72   Ht 1.715 m (5' 7.5\")   Wt 102 kg (224 lb)   BMI 34.57 kg/m²     Physical Exam  Constitutional:       Appearance: Normal appearance.   HENT:      Mouth/Throat:      Mouth: Mucous membranes are moist.      Pharynx: Oropharynx is clear.   Cardiovascular:      Rate and Rhythm: Normal rate and regular rhythm.   Skin:     Comments: Erythematous plaque on the L shoulder    Neurological:      Mental Status: He is alert.   Psychiatric:         Mood and Affect: Mood normal.         Behavior: Behavior normal.         Assessment/Plan   Problem List Items Addressed This Visit    None  Visit Diagnoses         Codes    Skin rash    -  Primary R21    stable   - treat with oral prednisone   - consider derm referral if not improving   - f/u PRN     Relevant Medications    predniSONE (Deltasone) 10 mg tablet               "

## 2024-09-26 ENCOUNTER — APPOINTMENT (OUTPATIENT)
Dept: PRIMARY CARE | Facility: CLINIC | Age: 29
End: 2024-09-26
Payer: COMMERCIAL

## 2025-03-18 ASSESSMENT — ENCOUNTER SYMPTOMS
SORE THROAT: 0
HEADACHES: 0
FEVER: 0
SWOLLEN GLANDS: 0
WHEEZING: 1
PND: 1
NECK PAIN: 1
LEG PAIN: 1
ORTHOPNEA: 1
VOMITING: 0
CLAUDICATION: 1
ABDOMINAL PAIN: 0
SYNCOPE: 0
SHORTNESS OF BREATH: 1
HEMOPTYSIS: 0
RHINORRHEA: 1
SPUTUM PRODUCTION: 1

## 2025-03-19 ENCOUNTER — APPOINTMENT (OUTPATIENT)
Dept: PRIMARY CARE | Facility: CLINIC | Age: 30
End: 2025-03-19
Payer: COMMERCIAL

## 2025-03-19 VITALS
SYSTOLIC BLOOD PRESSURE: 106 MMHG | TEMPERATURE: 97.8 F | HEIGHT: 68 IN | BODY MASS INDEX: 35.92 KG/M2 | OXYGEN SATURATION: 95 % | HEART RATE: 80 BPM | DIASTOLIC BLOOD PRESSURE: 78 MMHG | WEIGHT: 237 LBS

## 2025-03-19 DIAGNOSIS — Z91.018 TREE NUT ALLERGY: ICD-10-CM

## 2025-03-19 DIAGNOSIS — J45.20 MILD INTERMITTENT ASTHMA WITHOUT COMPLICATION (HHS-HCC): Primary | ICD-10-CM

## 2025-03-19 DIAGNOSIS — M79.605 PAIN OF LEFT LOWER EXTREMITY: ICD-10-CM

## 2025-03-19 DIAGNOSIS — Z00.00 WELLNESS EXAMINATION: ICD-10-CM

## 2025-03-19 DIAGNOSIS — E66.9 OBESITY (BMI 30-39.9): ICD-10-CM

## 2025-03-19 PROCEDURE — 3008F BODY MASS INDEX DOCD: CPT | Performed by: FAMILY MEDICINE

## 2025-03-19 PROCEDURE — 1036F TOBACCO NON-USER: CPT | Performed by: FAMILY MEDICINE

## 2025-03-19 PROCEDURE — 99214 OFFICE O/P EST MOD 30 MIN: CPT | Performed by: FAMILY MEDICINE

## 2025-03-19 RX ORDER — PREDNISONE 50 MG/1
50 TABLET ORAL DAILY
Qty: 5 TABLET | Refills: 0 | Status: SHIPPED | OUTPATIENT
Start: 2025-03-19 | End: 2025-03-24

## 2025-03-19 RX ORDER — ALBUTEROL SULFATE AND BUDESONIDE 90; 80 UG/1; UG/1
2 AEROSOL, METERED RESPIRATORY (INHALATION) EVERY 6 HOURS PRN
Qty: 32.1 G | Refills: 0 | Status: SHIPPED | OUTPATIENT
Start: 2025-03-19

## 2025-03-19 RX ORDER — MONTELUKAST SODIUM 10 MG/1
10 TABLET ORAL NIGHTLY
Qty: 90 TABLET | Refills: 1 | Status: SHIPPED | OUTPATIENT
Start: 2025-03-19 | End: 2025-09-15

## 2025-03-19 NOTE — PROGRESS NOTES
Breathing issues   Med refills   Wants an every day inhaler       Answers submitted by the patient for this visit:  Shortness of Breath Questionnaire (Submitted on 3/18/2025)  Chief Complaint: Shortness of breath  Chronicity: chronic  Onset: more than 1 month ago  Frequency: daily  Progression since onset: waxing and waning  Episode duration: 8 Hours  abdominal pain: No  chest pain: No  hemoptysis: No  sputum production: Yes  PND: Yes  ear pain: No  syncope: No  fever: No  headaches: No  claudication: Yes  leg pain: Yes  neck pain: Yes  rhinorrhea: Yes  orthopnea: Yes  sore throat: No  coryza: No  swollen glands: No  vomiting: No  wheezing: Yes  Aggravating factors: emotional upset, occupational exposure, smoke, exercise, fumes, lying flat

## 2025-03-19 NOTE — PATIENT INSTRUCTIONS

## 2025-03-19 NOTE — PROGRESS NOTES
Patient was identified as a fall risk. Risk prevention instructions provided.  Patient is here to establish.  He would like refill of his inhaler and is considering possibly a daily inhaler.  He works in a you environment.  He has never had a spirometer.  He is never been on Singulair.  He does feel like he has been acutely wheezing.  He also fell a couple times over the ice couple months ago and still having some of his soreness in the left leg.    REVIEW OF SYSTEMS: 12 systems negative except for those mentioned in the HPI.    PHYSICAL EXAMINATION:   Constitutional: The patient is alert, in no acute  distress.  Eyes: Extraocular movements are intact. Pupils are equal and reactive to light  ENT: Fluid with turbidity bilaterally  Neck: Supple without lymphadenopathy or JVD.  Heart: Regular rate and rhythm without murmur, click, gallop, or rub.  Lungs: Clear to auscultation bilaterally. No rales, rhonchi, or  wheezing.  Psychiatric: Good judgment and insight. Normal affect and mood.  Lymphatic: as noted above.  Skin: no rashes or lesions    Assessment:  per EMR    Plan:  Patient has never had Spyro at this point discussed steroid burst also going and having a respiratory allergy panel as well as also annual blood work CBC CMP A1c lipid panel thyroid.  Will come back for a spirometer use Airsupra see if needs a daily inhaled medication    This dictation was created using Dragon dictation and may contain errors  Answers submitted by the patient for this visit:  Shortness of Breath Questionnaire (Submitted on 3/18/2025)  Chief Complaint: Shortness of breath  Chronicity: chronic  Onset: more than 1 month ago  Frequency: daily  Progression since onset: waxing and waning  Episode duration: 8 Hours  abdominal pain: No  chest pain: No  hemoptysis: No  sputum production: Yes  PND: Yes  ear pain: No  syncope: No  fever: No  headaches: No  claudication: Yes  leg pain: Yes  neck pain: Yes  rhinorrhea: Yes  orthopnea: Yes  sore  throat: No  coryza: No  swollen glands: No  vomiting: No  wheezing: Yes  Aggravating factors: emotional upset, occupational exposure, smoke, exercise, fumes, lying flat

## 2025-03-21 DIAGNOSIS — Z91.018 TREE NUT ALLERGY: Primary | ICD-10-CM

## 2025-03-21 RX ORDER — EPINEPHRINE 0.3 MG/.3ML
1 INJECTION SUBCUTANEOUS ONCE AS NEEDED
Qty: 0.3 ML | Refills: 3 | Status: SHIPPED | OUTPATIENT
Start: 2025-03-21

## 2025-04-02 ENCOUNTER — APPOINTMENT (OUTPATIENT)
Dept: PRIMARY CARE | Facility: CLINIC | Age: 30
End: 2025-04-02
Payer: COMMERCIAL

## 2025-04-07 ENCOUNTER — APPOINTMENT (OUTPATIENT)
Dept: PRIMARY CARE | Facility: CLINIC | Age: 30
End: 2025-04-07
Payer: COMMERCIAL

## 2025-04-07 VITALS
SYSTOLIC BLOOD PRESSURE: 112 MMHG | BODY MASS INDEX: 36.83 KG/M2 | HEIGHT: 68 IN | TEMPERATURE: 97.7 F | HEART RATE: 81 BPM | WEIGHT: 243 LBS | DIASTOLIC BLOOD PRESSURE: 72 MMHG

## 2025-04-07 DIAGNOSIS — J45.20 MILD INTERMITTENT ASTHMA WITHOUT COMPLICATION (HHS-HCC): Primary | ICD-10-CM

## 2025-04-07 DIAGNOSIS — E66.9 OBESITY (BMI 30-39.9): ICD-10-CM

## 2025-04-07 PROCEDURE — 1036F TOBACCO NON-USER: CPT | Performed by: FAMILY MEDICINE

## 2025-04-07 PROCEDURE — 99213 OFFICE O/P EST LOW 20 MIN: CPT | Performed by: FAMILY MEDICINE

## 2025-04-07 PROCEDURE — 3008F BODY MASS INDEX DOCD: CPT | Performed by: FAMILY MEDICINE

## 2025-04-07 PROCEDURE — 94010 BREATHING CAPACITY TEST: CPT | Performed by: FAMILY MEDICINE

## 2025-04-07 RX ORDER — FLUTICASONE PROPIONATE AND SALMETEROL 250; 50 UG/1; UG/1
1 POWDER RESPIRATORY (INHALATION)
Qty: 60 EACH | Refills: 11 | Status: SHIPPED | OUTPATIENT
Start: 2025-04-07 | End: 2026-04-07

## 2025-04-07 ASSESSMENT — ENCOUNTER SYMPTOMS
SHORTNESS OF BREATH: 0
CHILLS: 0
SORE THROAT: 0
HEMOPTYSIS: 0
FEVER: 0
MYALGIAS: 0
RHINORRHEA: 0
WHEEZING: 0
COUGH: 1
SWEATS: 0
WEIGHT LOSS: 0
HEARTBURN: 0
HEADACHES: 0

## 2025-04-07 NOTE — PROGRESS NOTES
Harmeet       Answers submitted by the patient for this visit:  Cough Questionnaire (Submitted on 4/7/2025)  Chief Complaint: Cough  Chronicity: chronic  Cough characteristics: non-productive  chest pain: No  chills: No  ear congestion: No  ear pain: No  fever: No  headaches: No  heartburn: No  hemoptysis: No  myalgias: No  nasal congestion: No  postnasal drip: No  rash: No  rhinorrhea: No  shortness of breath: No  sore throat: No  sweats: No  weight loss: No  wheezing: No

## 2025-04-07 NOTE — PROGRESS NOTES
Patient is here for spirometry.  He definitely has had some huge improvement with the Airsupra and is no longer wheezing at night.    REVIEW OF SYSTEMS: 12 systems negative except for those mentioned in the HPI.    PHYSICAL EXAMINATION:   Constitutional: The patient is alert, in no acute  distress.  Eyes: Extraocular movements are intact.   ENT: external ear canals patent  Neck: no  JVD.  Heart: no JVD  Lungs: Normal respiration without stridor or nasal flaring   Psychiatric: Good judgment and insight. Normal affect and mood.  Skin: no rashes or lesions    Assessment:  per EMR    Plan:  Spirometry patient forgot his inhaler and session quality F however what is discernible on this is the fact that he does have symptoms and signs consistent with uncontrolled asthma.    [facility].    This dictation was created using Dragon dictation and may contain errors  Answers submitted by the patient for this visit:  Cough Questionnaire (Submitted on 4/7/2025)  Chief Complaint: Cough  Chronicity: chronic  Cough characteristics: non-productive  chest pain: No  chills: No  ear congestion: No  ear pain: No  fever: No  headaches: No  heartburn: No  hemoptysis: No  myalgias: No  nasal congestion: No  postnasal drip: No  rash: No  rhinorrhea: No  shortness of breath: No  sore throat: No  sweats: No  weight loss: No  wheezing: No

## 2025-05-05 ENCOUNTER — APPOINTMENT (OUTPATIENT)
Dept: PRIMARY CARE | Facility: CLINIC | Age: 30
End: 2025-05-05
Payer: COMMERCIAL

## 2025-05-05 VITALS
DIASTOLIC BLOOD PRESSURE: 76 MMHG | TEMPERATURE: 97.8 F | WEIGHT: 238 LBS | SYSTOLIC BLOOD PRESSURE: 115 MMHG | HEIGHT: 68 IN | BODY MASS INDEX: 36.07 KG/M2 | HEART RATE: 100 BPM

## 2025-05-05 DIAGNOSIS — J45.20 MILD INTERMITTENT ASTHMA WITHOUT COMPLICATION (HHS-HCC): Primary | ICD-10-CM

## 2025-05-05 DIAGNOSIS — J45.40 MODERATE PERSISTENT ASTHMA WITHOUT COMPLICATION (HHS-HCC): ICD-10-CM

## 2025-05-05 DIAGNOSIS — M76.32 IT BAND SYNDROME, LEFT: ICD-10-CM

## 2025-05-05 DIAGNOSIS — E66.9 OBESITY (BMI 30-39.9): ICD-10-CM

## 2025-05-05 PROCEDURE — 1036F TOBACCO NON-USER: CPT | Performed by: FAMILY MEDICINE

## 2025-05-05 PROCEDURE — 3008F BODY MASS INDEX DOCD: CPT | Performed by: FAMILY MEDICINE

## 2025-05-05 PROCEDURE — 94010 BREATHING CAPACITY TEST: CPT | Performed by: FAMILY MEDICINE

## 2025-05-05 PROCEDURE — 99215 OFFICE O/P EST HI 40 MIN: CPT | Performed by: FAMILY MEDICINE

## 2025-05-05 NOTE — PROGRESS NOTES
Patient is here to follow-up with spirometry.  He has had a huge quality-of-life increase on the Advair.  He cannot go out for a walk without wheezing or shortness of breath.  The only issue he is now having is on the outside of the left knee and the hip some discomfort and pain.    REVIEW OF SYSTEMS: 12 systems negative except for those mentioned in the HPI.    PHYSICAL EXAMINATION:   Constitutional: The patient is alert, in no acute  distress.  Eyes: Extraocular movements are intact.   ENT: external ear canals patent  Neck: no  JVD.  Heart: no JVD  Lungs: Clear to auscultation bilaterally psychiatric: Good judgment and insight. Normal affect and mood.  Skin: no rashes or lesions  MSK/neurologic: Cranials 2 through 12 grossly intact.  Pain along the IT band and insertion on the lateral aspect of the knee.  Normal hip and knee examination negative OMER testing      Assessment:  per EMR    Plan:  Discussed IT band rolling can still use the meloxicam.  If not also go to physical therapy doing this is more musculoskeletal in nature.    Spirometry: Session quality F nonreproducible and not unreadable.    At this point consider methylene choline challenge or negative pressure phoxim request the patient see pulmonology with how horrible otherwise the spirometry is.  Obviously he has had great improvement with his clinical course which is excellent but should still be followed up with a specialist and have a better readable test of functionality    This dictation was created using Dragon dictation and may contain errors

## 2025-06-18 DIAGNOSIS — L23.1 ALLERGIC CONTACT DERMATITIS DUE TO ADHESIVES: Primary | ICD-10-CM

## 2025-06-18 RX ORDER — TRIAMCINOLONE ACETONIDE 1 MG/G
OINTMENT TOPICAL 2 TIMES DAILY PRN
Qty: 60 G | Refills: 2 | Status: SHIPPED | OUTPATIENT
Start: 2025-06-18 | End: 2025-10-16

## 2025-06-20 ENCOUNTER — APPOINTMENT (OUTPATIENT)
Dept: PULMONOLOGY | Facility: CLINIC | Age: 30
End: 2025-06-20
Payer: COMMERCIAL

## 2025-08-15 ENCOUNTER — APPOINTMENT (OUTPATIENT)
Dept: PULMONOLOGY | Facility: CLINIC | Age: 30
End: 2025-08-15
Payer: COMMERCIAL